# Patient Record
Sex: FEMALE | Race: BLACK OR AFRICAN AMERICAN
[De-identification: names, ages, dates, MRNs, and addresses within clinical notes are randomized per-mention and may not be internally consistent; named-entity substitution may affect disease eponyms.]

---

## 2019-07-30 ENCOUNTER — HOSPITAL ENCOUNTER (OUTPATIENT)
Dept: HOSPITAL 61 - KCIC MAMMO | Age: 49
Discharge: HOME | End: 2019-07-30
Attending: FAMILY MEDICINE
Payer: COMMERCIAL

## 2019-07-30 DIAGNOSIS — R59.0: ICD-10-CM

## 2019-07-30 DIAGNOSIS — N63.14: ICD-10-CM

## 2019-07-30 DIAGNOSIS — N63.11: ICD-10-CM

## 2019-07-30 DIAGNOSIS — Z12.31: Primary | ICD-10-CM

## 2019-07-30 PROCEDURE — 77067 SCR MAMMO BI INCL CAD: CPT

## 2019-07-30 PROCEDURE — 77063 BREAST TOMOSYNTHESIS BI: CPT

## 2019-07-30 PROCEDURE — 76881 US COMPL JOINT R-T W/IMG: CPT

## 2019-07-30 NOTE — [facility]
Bilateral digital screening mammograms with 3-D tomosynthesis:

 

Reason for examination: Routine screening.

 

Comparison is made to previous studies dated 1/13/2016 and 12/4/2014.

 

Bilateral mammograms in CC and oblique projections were obtained with 2-D 

imaging and 3-D tomosynthesis imaging on a Siemens Inspiration unit and 

reviewed on the workstation. Interpretation was made with the benefit of 

CAD.

 

The skin and nipples show no abnormalities. No abnormal axillary lymph 

nodes are seen. The breast parenchyma is heterogeneously dense. (Breast 

density: Category C.) There are small nodules present in the right breast 

at the 10:00 B position and at the 5:30 B position. Further evaluation 

with ultrasound is recommended. There are no other dominant masses, 

suspicious calcifications or architectural distortion.

 

Impression:

 

Small nodules in the 10:00 B and 5:30 B positions of the right breast. 

Recommend further evaluation with ultrasound.

 

Your patient's mammogram demonstrates that she has dense breast tissue 

(breast density category C or D), which could hide abnormalities, and if 

she has other risk factors for breast cancer that have been identified, 

she might benefit from supplemental screening tests that may be suggested 

by you as her ordering physician. Dense breast tissue, in and of itself, 

is a relatively common condition. Therefore, this information is not 

provided to cause undue concern, but rather to raise your awareness and to

promote discussion with your patient regarding the presence of other risk 

factors, in addition to dense breast tissue. Your patient's mammography 

results will be sent to her.

 

BI-RAD Category 0: Incomplete. Needs additional imaging evaluation.

 

"Our facility is accredited by the [employer] 

Mammography Program."

 

This patient's information has been entered into a reminder system for the

patient to be notified with the results of her examination and a target 

date for the next mammogram.

 

Electronically signed by: Anabella Mcallister MD (7/30/2019 11:00 AM) [facility]

## 2019-07-30 NOTE — [facility]
Examination: Ultrasound right axilla

 

HISTORY: History of right axillary lymph node

 

COMPARISON: None available

 

FINDINGS: 

 

Ultrasound right axilla demonstrate a 8.8 mm benign-appearing lymph node 

identified in the right axilla

 

IMPRESSION:

 

Subcentimeter benign-appearing lymph node identified in the right axilla.

 

Electronically signed by: Eric Brown MD (7/30/2019 9:29 AM) [id number]

## 2019-08-16 ENCOUNTER — HOSPITAL ENCOUNTER (OUTPATIENT)
Dept: HOSPITAL 61 - KCIC US | Age: 49
Discharge: HOME | End: 2019-08-16
Attending: FAMILY MEDICINE
Payer: COMMERCIAL

## 2019-08-16 DIAGNOSIS — N60.01: Primary | ICD-10-CM

## 2019-08-16 PROCEDURE — 76641 ULTRASOUND BREAST COMPLETE: CPT

## 2019-08-16 NOTE — [facility]
BREAST RIGHT

 

Clinical Indication: Abnormal screening mammogram.

 

Comparison: Bilateral mammogram July 30, 2019.

 

TECHNIQUE: Real-time ultrasound imaging of the right breast is performed.

 

Findings: 

There are 3 adjacent complicated cysts at the 10:00 position 6 cm from the

nipple. The largest measures 4 mm and demonstrates posterior acoustic 

enhancement. The other 2 measure 2-3 mm. At the 6:00 position 3 cm from 

the nipple there is a bean shaped cyst measuring 10 x 5 mm. There is 

posterior acoustic enhancement. The cyst is mildly complicated with some 

internal echoes. These findings account for the nodules noted on 

mammogram. There is no abnormal axillary lymph node.

 

IMPRESSION:

1.  There are complicated cysts at the 10:00 and 6:00 positions of the 

right breast likely accounting for the nodularity noted on mammogram. 

Recommend six-month follow-up right breast ultrasound.

2.  BI-RADS Category 3, probably benign.

 

Electronically signed by: Rashaun Jean MD (8/16/2019 1:51 PM) [facility]

## 2020-02-27 ENCOUNTER — HOSPITAL ENCOUNTER (OUTPATIENT)
Dept: HOSPITAL 61 - KCIC US | Age: 50
Discharge: HOME | End: 2020-02-27
Attending: FAMILY MEDICINE
Payer: COMMERCIAL

## 2020-02-27 ENCOUNTER — HOSPITAL ENCOUNTER (OUTPATIENT)
Dept: HOSPITAL 61 - KCIC | Age: 50
Discharge: HOME | End: 2020-02-27
Attending: FAMILY MEDICINE
Payer: COMMERCIAL

## 2020-02-27 DIAGNOSIS — M40.292: Primary | ICD-10-CM

## 2020-02-27 DIAGNOSIS — M54.12: ICD-10-CM

## 2020-02-27 DIAGNOSIS — N64.89: Primary | ICD-10-CM

## 2020-02-27 PROCEDURE — 76641 ULTRASOUND BREAST COMPLETE: CPT

## 2020-02-27 PROCEDURE — 72050 X-RAY EXAM NECK SPINE 4/5VWS: CPT

## 2020-02-27 NOTE — [facility]
EXAM: Cervical spine, 5 views.

 

HISTORY: Pain.

 

COMPARISON: None.

 

FINDINGS: 5 views of the cervical spine are obtained. There is cervical 

kyphosis centered at C5. There is mild anterolisthesis of C4 on C5 and 

minimal anterolisthesis of C6 on C7. There is no fracture.

 

IMPRESSION: 

1. Mild cervical kyphosis and minimal to mild listhesis at the 

aforementioned levels.

2. No acute osseous finding.

 

Electronically signed by: Prachi Delarosa MD (2/27/2020 5:05 PM) [email or web address]

## 2020-10-09 ENCOUNTER — HOSPITAL ENCOUNTER (OUTPATIENT)
Dept: HOSPITAL 61 - KCIC MAMMO | Age: 50
End: 2020-10-09
Attending: FAMILY MEDICINE
Payer: COMMERCIAL

## 2020-10-09 DIAGNOSIS — Z12.31: Primary | ICD-10-CM

## 2020-10-09 DIAGNOSIS — N64.89: ICD-10-CM

## 2020-10-09 PROCEDURE — 77067 SCR MAMMO BI INCL CAD: CPT

## 2020-10-09 PROCEDURE — 77063 BREAST TOMOSYNTHESIS BI: CPT

## 2020-10-09 NOTE — [facility]
Bilateral digital screening mammograms with 3-D tomosynthesis:

 

Reason for examination: Routine screening.

 

Comparison is made to previous studies dated back to 12/4/2014.

 

Bilateral mammograms in CC and oblique projections were obtained with 2-D 

imaging and 3-D tomosynthesis imaging on a Siemens Inspiration unit and 

reviewed on the workstation. Interpretation was made with the benefit of 

CAD.

 

The skin and nipples show no abnormalities. No abnormal axillary lymph 

nodes are seen. The breast parenchyma shows scattered fatty and 

fibroglandular density. (Breast density: Category B.) There is a 

circumscribed nodule in the 10:00 C position of the right breast 11 cm 

from the nipple measuring 9 mm in size. There are also small circumscribed

nodules at the 10:00 B position of the right breast measuring 8.2 mm in 

size and at the 9:00 B position measuring up to 6.5 mm in size. There also

appears to be a new circumscribed nodule measuring 9.6 mm in size 

approximately 4 cm from the nipple in the 3:00 position of the left breast

with an additional small circumscribed nodule at approximately the 3:30 

position of the left breast 3 cm from the nipple measuring approximately 5

mm in size. Further evaluation with ultrasound is recommended. No 

suspicious calcifications are seen.

 

Impression:

 

New circumscribed nodules at the 10:00 C position of the right breast and 

3:00 and 3:30 B positions of the left breast. Continued presence of 

circumscribed lesions at the 10:00 B and 9:00 B positions of the right 

breast with the nodules at the 9:00 B position showing interval increase 

in size. Recommend further evaluation with bilateral breast ultrasound.

 

BI-RADS Category 0: Incomplete. Needs additional imaging evaluation.

 

"Our facility is accredited by the [employer] 

Mammography Program."

 

This patient's information has been entered into a reminder system for the

patient to be notified with the results of her examination and a target 

date for the next mammogram.

 

Electronically signed by: Anabella Mcallister MD (10/9/2020 3:48 PM) [email or web address]

## 2020-11-04 ENCOUNTER — HOSPITAL ENCOUNTER (OUTPATIENT)
Dept: HOSPITAL 61 - KCIC US | Age: 50
End: 2020-11-04
Attending: FAMILY MEDICINE
Payer: COMMERCIAL

## 2020-11-04 DIAGNOSIS — N60.01: ICD-10-CM

## 2020-11-04 DIAGNOSIS — R92.8: Primary | ICD-10-CM

## 2020-11-04 DIAGNOSIS — N60.02: ICD-10-CM

## 2020-11-04 PROCEDURE — 76641 ULTRASOUND BREAST COMPLETE: CPT

## 2020-11-04 NOTE — [facility]
Bilateral breast ultrasound:

 

Reason for examination: Follow-up for nodules seen on mammographic exam.

 

Comparison is made to mammographic exam dated 10/9/2020 and previous right

breast ultrasound dated 2/27/2020.

 

In the right breast at the 6:00 position 3 cm from the nipple, there is a 

6 mm hypoechoic fibrocystic lesion. In the 9:00 position 5 cm from the 

nipple, there is a 6.6 mm hypoechoic circumscribed lesion probably 

representing a complicated cyst. In the 10:00 position 6 cm from the 

nipple, there is a 6.3 mm circumscribed fibrocystic lesion which is 

stable. In the 10:00 position 10 cm from the nipple, there is a new 7 mm 

septated cystic lesion. No suspicious nodules are seen. No abnormal 

appearing lymph nodes are seen in the right axilla.

 

In the left breast at the 3:00 position 2 cm from the nipple, there are 2 

small hypoechoic lesions measuring up to 8.4 mm in size consistent with 

simple and complicated cysts. There is a cluster of cysts together 

measuring 1.1 cm in size at the 3:00 position 2 cm from the nipple. At the

3:30 position 4 cm from the nipple, there is a 8.2 mm hypoechoic 

circumscribed fibrocystic type lesion in parallel orientation. No 

suspicious-appearing nodules are seen. No abnormal appearing lymph nodes 

are seen in the left axilla.

 

IMPRESSION:

 

Simple and complicated cysts, septated cysts and fibrocystic lesions 

bilaterally. No suspicious-appearing lesion seen. Recommend 6 month 

follow-up with ultrasound.

 

BI-RADS Category 3:  Probably Benign.

 

"Our facility is accredited by the [employer] 

Mammography Program."

 

This patient's information has been entered into a reminder system for the

patient to be notified with the results of her examination and a target 

date for the next mammogram.

 

Electronically signed by: Anabella Mcallister MD (11/4/2020 7:33 PM) [email or web address]

## 2021-05-05 ENCOUNTER — HOSPITAL ENCOUNTER (OUTPATIENT)
Dept: HOSPITAL 61 - KCIC US | Age: 51
End: 2021-05-05
Attending: FAMILY MEDICINE
Payer: COMMERCIAL

## 2021-05-05 DIAGNOSIS — N64.89: ICD-10-CM

## 2021-05-05 DIAGNOSIS — Z12.31: Primary | ICD-10-CM

## 2021-05-05 PROCEDURE — 76641 ULTRASOUND BREAST COMPLETE: CPT

## 2021-05-05 NOTE — [facility]
Bilateral breast ultrasound:



Reason for examination: Follow-up nodules



Comparison is made to previous examinations dated 11/4/2020 and 2/27/2020.



Ultrasound examination was performed bilaterally of the breasts and axilla.



The right breast continues to show small subcentimeter cystic lesions at the 9:00 position 5 cm from 
the nipple, 10:00 position 6 cm from the nipple and 10:00 position 10 cm from the nipple. No solid borden
spicious-appearing lesions are seen. No abnormal appearing lymph nodes are seen in the right axilla.



The left breast continues to show a septated cystic lesion in the 3:00 position 4 cm from the nipple 
measuring 1.3 cm in greatest dimension. No solid suspicious-appearing lesions are seen. No abnormal a
ppearing lymph nodes are seen in the left axilla.



IMPRESSION:



Benign-appearing cystic lesions bilaterally. No suspicious abnormality seen. Recommend routine mammog
raphic follow-up.



BI-RADS Category 2:  Benign.



"Our facility is accredited by the [employer] Mammography Program."



This patient's information has been entered into a reminder system for the patient to be notified wit
h the results of her examination and a target date for the next mammogram.



Electronically signed by: Anabella Mcallister MD (5/5/2021 9:50 AM) [email or web address]